# Patient Record
Sex: FEMALE | Race: WHITE | Employment: FULL TIME | URBAN - METROPOLITAN AREA
[De-identification: names, ages, dates, MRNs, and addresses within clinical notes are randomized per-mention and may not be internally consistent; named-entity substitution may affect disease eponyms.]

---

## 2018-08-22 ENCOUNTER — OFFICE VISIT (OUTPATIENT)
Dept: FAMILY MEDICINE CLINIC | Age: 33
End: 2018-08-22

## 2018-08-22 VITALS
OXYGEN SATURATION: 98 % | HEART RATE: 67 BPM | WEIGHT: 167 LBS | SYSTOLIC BLOOD PRESSURE: 109 MMHG | TEMPERATURE: 98.2 F | BODY MASS INDEX: 30.73 KG/M2 | HEIGHT: 62 IN | RESPIRATION RATE: 16 BRPM | DIASTOLIC BLOOD PRESSURE: 66 MMHG

## 2018-08-22 DIAGNOSIS — N30.00 ACUTE CYSTITIS WITHOUT HEMATURIA: ICD-10-CM

## 2018-08-22 DIAGNOSIS — R30.0 BURNING WITH URINATION: Primary | ICD-10-CM

## 2018-08-22 LAB
BILIRUB UR QL STRIP: NEGATIVE
GLUCOSE UR-MCNC: NEGATIVE MG/DL
KETONES P FAST UR STRIP-MCNC: NEGATIVE MG/DL
PH UR STRIP: 5.5 [PH] (ref 4.6–8)
PROT UR QL STRIP: NORMAL
SP GR UR STRIP: 1.03 (ref 1–1.03)
UA UROBILINOGEN AMB POC: NORMAL (ref 0.2–1)
URINALYSIS CLARITY POC: CLEAR
URINALYSIS COLOR POC: YELLOW
URINE BLOOD POC: NORMAL
URINE LEUKOCYTES POC: NORMAL
URINE NITRITES POC: POSITIVE

## 2018-08-22 RX ORDER — NITROFURANTOIN 25; 75 MG/1; MG/1
100 CAPSULE ORAL 2 TIMES DAILY
Qty: 14 CAP | Refills: 0 | Status: SHIPPED | OUTPATIENT
Start: 2018-08-22 | End: 2018-08-29

## 2018-08-22 NOTE — PROGRESS NOTES
Pt here c/o urinary burning since yesterday. Reports having slight abdominal discomfort as well. Has not taken any OTC medication. Subjective:     Joyce Caba is a 34 y.o. female who complains of dysuria, frequency, urgency for 2 days. Patient denies flank pain, vomiting, fever, unusual vaginal discharge. Patient does not have a history of recurrent UTI. Patient does not have a history of pyelonephritis. Patient Active Problem List   Diagnosis Code    Lazy eye H53.009     Current Outpatient Prescriptions   Medication Sig Dispense Refill    nitrofurantoin, macrocrystal-monohydrate, (MACROBID) 100 mg capsule Take 1 Cap by mouth two (2) times a day for 7 days. 14 Cap 0    norethindrone-ethinyl estradiol (NECON 1/35, 28,) 1-35 mg-mcg per tablet Take  by mouth. No Known Allergies     Review of Systems  Pertinent items are noted in HPI. Objective:     Visit Vitals    /66 (BP 1 Location: Right arm, BP Patient Position: Sitting)    Pulse 67    Temp 98.2 °F (36.8 °C) (Oral)    Resp 16    Ht 5' 2\" (1.575 m)    Wt 167 lb (75.8 kg)    LMP 08/08/2018 (Exact Date)    SpO2 98%    BMI 30.54 kg/m2     General:  alert, cooperative, no distress   Abdomen: soft, nontender, nondistended, no masses or organomegaly. Back:  CVA tenderness absent   :  defer exam     Laboratory:   Urine dipstick shows positive for nitrates. Micro exam: negative for WBCs or RBCs. Assessment/Plan:     Acute cystitis     1. nitrofurantoin  2. Maintain adequate hydration  3. May use OTC pyridium as desired, which will turn urine orange/red color  4. Follow up if symptoms not improving, and prn. ICD-10-CM ICD-9-CM    1. Burning with urination R30.0 788.1 AMB POC URINALYSIS DIP STICK AUTO W/O MICRO   2. Acute cystitis without hematuria N30.00 595.0 nitrofurantoin, macrocrystal-monohydrate, (MACROBID) 100 mg capsule     Encounter Diagnoses   Name Primary?     Burning with urination Yes    Acute cystitis without hematuria      Orders Placed This Encounter    AMB POC URINALYSIS DIP STICK AUTO W/O MICRO    nitrofurantoin, macrocrystal-monohydrate, (MACROBID) 100 mg capsule   .

## 2018-08-22 NOTE — MR AVS SNAPSHOT
[address] [address] 
[phone] Patient: Dagoberto Halsted MRN: [id number] :1985 Visit Information Date & Time Provider Department Dept. Phone Encounter #  
 2018  7:15 AM Taiwo Lozoya MD [address] [facility] [phone] [id number] Upcoming Health Maintenance Date Due  
 PAP AKA CERVICAL CYTOLOGY 2008 DTaP/Tdap/Td series (2 - Td) 10/10/2016 Influenza Age 5 to Adult 2018 Allergies as of 2018  Review Complete On: 2018 By: Taiwo Lozoya MD  
 No Known Allergies Current Immunizations  Never Reviewed Name Date Meningococcal Vaccine 10/10/2006 TDAP Vaccine 10/10/2006 Not reviewed this visit You Were Diagnosed With   
  
 Codes Comments Burning with urination    -  Primary ICD-10-CM: R30.0 ICD-9-CM: 396. 1 Acute cystitis without hematuria     ICD-10-CM: N30.00 ICD-9-CM: 595.0 Vitals BP Pulse Temp Resp Height(growth percentile) Weight(growth percentile) 109/66 (BP 1 Location: Right arm, BP Patient Position: Sitting) 67 98.2 °F (36.8 °C) (Oral) 16 5' 2\" (1.575 m) 167 lb (75.8 kg) LMP SpO2 BMI OB Status Smoking Status 2018 (Exact Date) 98% 30.54 kg/m2 Having regular periods Never Smoker BMI and BSA Data Body Mass Index Body Surface Area 30.54 kg/m 2 1.82 m 2 Preferred Pharmacy Pharmacy Name Phone [id number] [address] [facility] [phone] Your Updated Medication List  
  
   
This list is accurate as of 18  7:50 AM.  Always use your most recent med list.  
  
  
  
  
 Irma Mendez  () 1-35 mg-mcg Tab Generic drug:  norethindrone-ethinyl estradiol Take  by mouth. nitrofurantoin (macrocrystal-monohydrate) 100 mg capsule Commonly known as:  MACROBID Take 1 Cap by mouth two (2) times a day for 7 days. Prescriptions Sent to Pharmacy Refills  
 nitrofurantoin, macrocrystal-monohydrate, (MACROBID) 100 mg capsule 0 Sig: Take 1 Cap by mouth two (2) times a day for 7 days. Class: Normal  
 Pharmacy: [facility] [address]  #: [phone] Route: Oral  
  
We Performed the Following AMB POC URINALYSIS DIP STICK AUTO W/O MICRO [67036 CPT(R)] Patient Instructions Body Mass Index: Care Instructions Your Care Instructions Body mass index (BMI) can help you see if your weight is raising your risk for health problems. It uses a formula to compare how much you weigh with how tall you are. · A BMI lower than 18.5 is considered underweight. · A BMI between 18.5 and 24.9 is considered healthy. · A BMI between 25 and 29.9 is considered overweight. A BMI of 30 or higher is considered obese. If your BMI is in the normal range, it means that you have a lower risk for weight-related health problems. If your BMI is in the overweight or obese range, you may be at increased risk for weight-related health problems, such as high blood pressure, heart disease, stroke, arthritis or joint pain, and diabetes. If your BMI is in the underweight range, you may be at increased risk for health problems such as fatigue, lower protection (immunity) against illness, muscle loss, bone loss, hair loss, and hormone problems. BMI is just one measure of your risk for weight-related health problems. You may be at higher risk for health problems if you are not active, you eat an unhealthy diet, or you drink too much alcohol or use tobacco products. Follow-up care is a key part of your treatment and safety. Be sure to make and go to all appointments, and call your doctor if you are having problems. It's also a good idea to know your test results and keep a list of the medicines you take. How can you care for yourself at home? · Practice healthy eating habits. This includes eating plenty of fruits, vegetables, whole grains, lean protein, and low-fat dairy. · If your doctor recommends it, get more exercise. Walking is a good choice. Bit by bit, increase the amount you walk every day. Try for at least 30 minutes on most days of the week. · Do not smoke. Smoking can increase your risk for health problems. If you need help quitting, talk to your doctor about stop-smoking programs and medicines. These can increase your chances of quitting for good. · Limit alcohol to 2 drinks a day for men and 1 drink a day for women. Too much alcohol can cause health problems. If you have a BMI higher than 25 · Your doctor may do other tests to check your risk for weight-related health problems. This may include measuring the distance around your waist. A waist measurement of more than 40 inches in men or 35 inches in women can increase the risk of weight-related health problems. · Talk with your doctor about steps you can take to stay healthy or improve your health. You may need to make lifestyle changes to lose weight and stay healthy, such as changing your diet and getting regular exercise. If you have a BMI lower than 18.5 · Your doctor may do other tests to check your risk for health problems. · Talk with your doctor about steps you can take to stay healthy or improve your health. You may need to make lifestyle changes to gain or maintain weight and stay healthy, such as getting more healthy foods in your diet and doing exercises to build muscle. Where can you learn more? Go to http://jose-rossana.info/. Enter S176 in the search box to learn more about \"Body Mass Index: Care Instructions. \" Current as of: October 13, 2016 Content Version: 11.4 © 9987-5478 [employer].  Care instructions adapted under license by [employer] (which disclaims liability or warranty for this information). If you have questions about a medical condition or this instruction, always ask your healthcare professional. [address] any warranty or liability for your use of this information. Introducing [facility] & [facility] SERVICES! [employer] introduces [employer] patient portal. Now you can access parts of your medical record, email your doctor's office, and request medication refills online. 1. In your internet browser, go to https://[email or web address]. [employer] [facility] 2. Click on the First Time User? Click Here link in the Sign In box. You will see the New Member Sign Up page. 3. Enter your [facility] Access Code exactly as it appears below. You will not need to use this code after youve completed the sign-up process. If you do not sign up before the expiration date, you must request a new code. · [facility] Access Code: [email or web address] Expires: 11/20/2018  7:50 AM 
 
4. Enter the last four digits of your Social Security Number (xxxx) and Date of Birth (mm/dd/yyyy) as indicated and click Submit. You will be taken to the next sign-up page. 5. Create a [facility] ID. This will be your [facility] login ID and cannot be changed, so think of one that is secure and easy to remember. 6. Create a [facility] password. You can change your password at any time. 7. Enter your Password Reset Question and Answer. This can be used at a later time if you forget your password. 8. Enter your e-mail address. You will receive e-mail notification when new information is available in [address] 9. Click Sign Up. You can now view and download portions of your medical record. 10. Click the Download Summary menu link to download a portable copy of your medical information. If you have questions, please visit the Frequently Asked Questions section of the [facility] website. Remember, [facility] is NOT to be used for urgent needs. For medical emergencies, dial 911. Now available from your iPhone and Android! Please provide this summary of care documentation to your next provider. Your primary care clinician is listed as NATE DICKINSON. If you have any questions after today's visit, please call [phone].

## 2018-08-22 NOTE — PATIENT INSTRUCTIONS
Body Mass Index: Care Instructions  Your Care Instructions    Body mass index (BMI) can help you see if your weight is raising your risk for health problems. It uses a formula to compare how much you weigh with how tall you are. · A BMI lower than 18.5 is considered underweight. · A BMI between 18.5 and 24.9 is considered healthy. · A BMI between 25 and 29.9 is considered overweight. A BMI of 30 or higher is considered obese. If your BMI is in the normal range, it means that you have a lower risk for weight-related health problems. If your BMI is in the overweight or obese range, you may be at increased risk for weight-related health problems, such as high blood pressure, heart disease, stroke, arthritis or joint pain, and diabetes. If your BMI is in the underweight range, you may be at increased risk for health problems such as fatigue, lower protection (immunity) against illness, muscle loss, bone loss, hair loss, and hormone problems. BMI is just one measure of your risk for weight-related health problems. You may be at higher risk for health problems if you are not active, you eat an unhealthy diet, or you drink too much alcohol or use tobacco products. Follow-up care is a key part of your treatment and safety. Be sure to make and go to all appointments, and call your doctor if you are having problems. It's also a good idea to know your test results and keep a list of the medicines you take. How can you care for yourself at home? · Practice healthy eating habits. This includes eating plenty of fruits, vegetables, whole grains, lean protein, and low-fat dairy. · If your doctor recommends it, get more exercise. Walking is a good choice. Bit by bit, increase the amount you walk every day. Try for at least 30 minutes on most days of the week. · Do not smoke. Smoking can increase your risk for health problems. If you need help quitting, talk to your doctor about stop-smoking programs and medicines. These can increase your chances of quitting for good. · Limit alcohol to 2 drinks a day for men and 1 drink a day for women. Too much alcohol can cause health problems. If you have a BMI higher than 25  · Your doctor may do other tests to check your risk for weight-related health problems. This may include measuring the distance around your waist. A waist measurement of more than 40 inches in men or 35 inches in women can increase the risk of weight-related health problems. · Talk with your doctor about steps you can take to stay healthy or improve your health. You may need to make lifestyle changes to lose weight and stay healthy, such as changing your diet and getting regular exercise. If you have a BMI lower than 18.5  · Your doctor may do other tests to check your risk for health problems. · Talk with your doctor about steps you can take to stay healthy or improve your health. You may need to make lifestyle changes to gain or maintain weight and stay healthy, such as getting more healthy foods in your diet and doing exercises to build muscle. Where can you learn more? Go to http://jose-rossana.info/. Enter S176 in the search box to learn more about \"Body Mass Index: Care Instructions. \"  Current as of: October 13, 2016  Content Version: 11.4  © 4975-3261 [employer]. Care instructions adapted under license by [employer] (which disclaims liability or warranty for this information). If you have questions about a medical condition or this instruction, always ask your healthcare professional. [address] any warranty or liability for your use of this information.

## 2018-10-16 ENCOUNTER — TELEPHONE (OUTPATIENT)
Dept: FAMILY MEDICINE CLINIC | Age: 33
End: 2018-10-16

## 2018-10-16 RX ORDER — CIPROFLOXACIN 500 MG/1
500 TABLET ORAL 2 TIMES DAILY
Qty: 10 TAB | Refills: 0 | Status: SHIPPED | OUTPATIENT
Start: 2018-10-16 | End: 2018-10-21

## 2018-10-16 NOTE — TELEPHONE ENCOUNTER
Patient states she has another uti. Can you sent in a antibiotic to [facility] on file?  Any questions call her @[phone]

## 2022-09-07 ENCOUNTER — OFFICE VISIT (OUTPATIENT)
Dept: FAMILY MEDICINE CLINIC | Age: 37
End: 2022-09-07
Payer: COMMERCIAL

## 2022-09-07 VITALS
OXYGEN SATURATION: 94 % | WEIGHT: 219.2 LBS | TEMPERATURE: 98.5 F | BODY MASS INDEX: 40.34 KG/M2 | HEIGHT: 62 IN | DIASTOLIC BLOOD PRESSURE: 83 MMHG | SYSTOLIC BLOOD PRESSURE: 124 MMHG | HEART RATE: 79 BPM

## 2022-09-07 DIAGNOSIS — J01.10 ACUTE NON-RECURRENT FRONTAL SINUSITIS: Primary | ICD-10-CM

## 2022-09-07 PROCEDURE — 99213 OFFICE O/P EST LOW 20 MIN: CPT | Performed by: NURSE PRACTITIONER

## 2022-09-07 RX ORDER — AMOXICILLIN AND CLAVULANATE POTASSIUM 875; 125 MG/1; MG/1
1 TABLET, FILM COATED ORAL 2 TIMES DAILY
Qty: 20 TABLET | Refills: 0 | Status: SHIPPED | OUTPATIENT
Start: 2022-09-07 | End: 2022-09-17

## 2022-09-07 RX ORDER — GUAIFENESIN 600 MG/1
600 TABLET, EXTENDED RELEASE ORAL 2 TIMES DAILY
Qty: 10 TABLET | Refills: 0 | Status: SHIPPED | OUTPATIENT
Start: 2022-09-07 | End: 2022-09-12

## 2022-09-07 NOTE — PROGRESS NOTES
Assessment/Plan:     1. Acute non-recurrent frontal sinusitis  Add rx  -     amoxicillin-clavulanate (AUGMENTIN) 875-125 mg per tablet; Take 1 Tablet by mouth two (2) times a day for 10 days. , Normal, Disp-20 Tablet, R-0  -     guaiFENesin ER (MUCINEX) 600 mg ER tablet; Take 1 Tablet by mouth two (2) times a day for 5 days. , Normal, Disp-10 Tablet, R-0    The above diagnosis is a new problem. We discussed expected course, resolution, and complications of diagnosis in detail. Return if symptoms worsen or fail to improve. I have discussed the diagnosis with the patient and the intended plan as seen in the above orders. The patient has received an after-visit summary and questions were answered concerning future plans. Patient conveyed understanding of the plan at the time of the visit. Subjective:   Adán Arizmendi presents for evaluation of Sore Throat (X1  week ), Nasal Congestion (Green mucus), and Ear Fullness (States that they have been popping for a week)     This started 7 days ago, and is gradually worsening since that time. She also reports headache, sinus congestion, rhinorrhea, post nasal drip, sore throat, and dry cough. She denies a history of: fever, fatigue, myalgias, hoarseness, productive cough, SOB, and [place]. Treatments have included: decongestants. Relevant PMH: No pertinent additional PMH.   Patient reports sick contacts: no    /83   Pulse 79   Temp 98.5 °F (36.9 °C)   Ht 5' 2\" (1.575 m)   Wt 219 lb 3.2 oz (99.4 kg)   LMP 08/09/2022 (Approximate)   SpO2 94%   BMI 40.09 kg/m²      Physical Exam  General: alert, cooperative, no distress, appears stated age  Eye exam: negative  Ear exam: normal TM's and external ear canals AU  Sinus exam: tenderness over bilateral maxillary  Oropharynx exam: abnormal findings: moderate oropharyngeal erythema and postnasal drainage  Neck exam: supple, symmetrical, trachea midline, no adenopathy, thyroid: not enlarged, symmetric, no tenderness/mass/nodules, no carotid bruit, and no JVD  Heart exam: normal rate, regular rhythm, normal S1, S2, no murmurs, rubs, clicks or gallops  Lung exam: clear to auscultation bilaterally    An electronic signature was used to authenticate this note.   -- Ivon Shabazz NP   9/7/2022

## 2022-09-07 NOTE — PROGRESS NOTES
Smith Hsieh is a 30 y.o. female , id x 2(name and ). Reviewed record, history, and  medications. Chief Complaint   Patient presents with    Sore Throat     X1  week     Nasal Congestion     Green mucus    Ear Fullness     States that they have been popping for a week       Vitals:    22 1620   BP: 124/83   Pulse: 79   Temp: 98.5 °F (36.9 °C)   SpO2: 94%   Weight: 219 lb 3.2 oz (99.4 kg)   Height: 5' 2\" (1.575 m)       Coordination of Care Questionnaire:   1. Have you been to the ER, urgent care clinic since your last visit? Hospitalized since your last visit? No    2. Have you seen or consulted any other health care providers outside of the [address] [place] [address] since your last visit? Include any pap smears or colon screening. No      3 most recent PHQ Screens 2022   Little interest or pleasure in doing things Not at all   Feeling down, depressed, irritable, or hopeless Not at all   Total Score PHQ 2 0       Patient is accompanied by self I have received verbal consent from Smith Hsieh to discuss any/all medical information while they are present in the room.

## 2022-09-08 ENCOUNTER — TELEPHONE (OUTPATIENT)
Dept: FAMILY MEDICINE CLINIC | Age: 37
End: 2022-09-08

## 2022-09-08 NOTE — TELEPHONE ENCOUNTER
Patient called stated that the pharmacy that her augmentin does not have and wishes it to be called to [facility] on [address]    Patient may be reached at [phone]

## 2022-09-08 NOTE — TELEPHONE ENCOUNTER
Called patient to clarify pharmacy (I was able to find heidi listed at [facility] in [place] but not [facility]); patient states the pharmacy was able to transmit the rx to a different location for her already.